# Patient Record
Sex: MALE
[De-identification: names, ages, dates, MRNs, and addresses within clinical notes are randomized per-mention and may not be internally consistent; named-entity substitution may affect disease eponyms.]

---

## 2020-02-06 PROBLEM — Z00.00 ENCOUNTER FOR PREVENTIVE HEALTH EXAMINATION: Status: ACTIVE | Noted: 2020-02-06

## 2020-02-19 ENCOUNTER — APPOINTMENT (OUTPATIENT)
Dept: NEUROLOGY | Facility: CLINIC | Age: 65
End: 2020-02-19
Payer: MEDICAID

## 2020-02-19 VITALS
SYSTOLIC BLOOD PRESSURE: 170 MMHG | WEIGHT: 180 LBS | HEART RATE: 97 BPM | BODY MASS INDEX: 28.25 KG/M2 | DIASTOLIC BLOOD PRESSURE: 73 MMHG | HEIGHT: 67 IN | TEMPERATURE: 98 F

## 2020-02-19 DIAGNOSIS — Z78.9 OTHER SPECIFIED HEALTH STATUS: ICD-10-CM

## 2020-02-19 DIAGNOSIS — Z86.69 PERSONAL HISTORY OF OTHER DISEASES OF THE NERVOUS SYSTEM AND SENSE ORGANS: ICD-10-CM

## 2020-02-19 DIAGNOSIS — H53.2 DIPLOPIA: ICD-10-CM

## 2020-02-19 DIAGNOSIS — F17.200 NICOTINE DEPENDENCE, UNSPECIFIED, UNCOMPLICATED: ICD-10-CM

## 2020-02-19 DIAGNOSIS — Z87.898 PERSONAL HISTORY OF OTHER SPECIFIED CONDITIONS: ICD-10-CM

## 2020-02-19 DIAGNOSIS — Z84.89 FAMILY HISTORY OF OTHER SPECIFIED CONDITIONS: ICD-10-CM

## 2020-02-19 DIAGNOSIS — Z80.3 FAMILY HISTORY OF MALIGNANT NEOPLASM OF BREAST: ICD-10-CM

## 2020-02-19 PROCEDURE — 99205 OFFICE O/P NEW HI 60 MIN: CPT

## 2020-02-19 NOTE — PHYSICAL EXAM
[General Appearance - Alert] : alert [General Appearance - In No Acute Distress] : in no acute distress [General Appearance - Well-Appearing] : healthy appearing [] : normal voice and communication [Cranial Nerves Optic (II)] : visual acuity intact bilaterally,  visual fields full to confrontation, pupils equal round and reactive to light [Cranial Nerves Oculomotor (III)] : extraocular motion intact [Cranial Nerves Trigeminal (V)] : facial sensation intact symmetrically [Cranial Nerves Facial (VII)] : face symmetrical [Cranial Nerves Vestibulocochlear (VIII)] : hearing was intact bilaterally [Cranial Nerves Glossopharyngeal (IX)] : tongue and palate midline [Cranial Nerves Accessory (XI - Cranial And Spinal)] : head turning and shoulder shrug symmetric [Motor Tone] : muscle tone was normal in all four extremities [Cranial Nerves Hypoglossal (XII)] : there was no tongue deviation with protrusion [Motor Strength] : muscle strength was normal in all four extremities [Motor Handedness Right-Handed] : the patient is right hand dominant [Sensation Tactile Decrease] : light touch was intact [Abnormal Walk] : normal gait [Balance] : balance was intact [Tremor] : a tremor present [2+] : Patella right 2+ [1+] : Ankle jerk right 1+ [Paresis Pronator Drift Left-Sided] : no pronator drift on the left [Paresis Pronator Drift Right-Sided] : no pronator drift on the right [Motor Strength Upper Extremities Bilaterally] : strength was normal in both upper extremities [FreeTextEntry5] : no fatigue with sustained upgaze. [Motor Strength Lower Extremities Bilaterally] : strength was normal in both lower extremities [FreeTextEntry6] : no fatigueable weakness detected.

## 2020-02-19 NOTE — REVIEW OF SYSTEMS
[Joint Pain] : joint pain [Negative] : Heme/Lymph [de-identified] : HEAD INJURY,CHANGE IN VISION  [FreeTextEntry9] : GOUT

## 2020-02-19 NOTE — HISTORY OF PRESENT ILLNESS
[FreeTextEntry1] : Mr Trotter is a 64 y/o right handed man. He is being evaluated for double vision. He states that he has been having episodes of vertical double vision for 4-5 years. This will last for several hours and between spells he feels normal. He does not have any trouble swallowing or breathing. He does note that if he walks half a block his legs feel "heavy". If he rests for a minute they feel normal. He states that he had a brain MRI about 3 years ago and this was normal.

## 2020-02-19 NOTE — ASSESSMENT
[FreeTextEntry1] : 1. Episodic vertical diplopia.\par \par Mr Trotter has been experiencing episodes of double vision for the past 4-5 years. He does not have any other bulbar symptoms and no feeling of weakness except his legs get "heavy" when he walks half a block. On exam today he has a mild postural tremor in his upper extremities but the remainder of his exam is normal. I do not detect any fatigueable weakness.\par \par I discussed my findings with him today. I think it is important to rule out ocular myasthenia and will obtain antibody panel. I will also send him for an EMG as the "heaviness" in his legs suggests more generalized involvement. He will return after testing and his treatment will depend on the results.

## 2020-03-05 LAB
ACHR BIND AB SER-ACNC: <0.3 NMOL/L
ACHR BLOCK AB SER QL: <15

## 2020-03-10 LAB
ACHR MOD AB SER-ACNC: 20
ACRM BINDING ANTIBODY: 0 NMOL/L